# Patient Record
Sex: FEMALE | URBAN - METROPOLITAN AREA
[De-identification: names, ages, dates, MRNs, and addresses within clinical notes are randomized per-mention and may not be internally consistent; named-entity substitution may affect disease eponyms.]

---

## 2023-10-11 ENCOUNTER — APPOINTMENT (RX ONLY)
Dept: URBAN - METROPOLITAN AREA CLINIC 55 | Facility: CLINIC | Age: 33
Setting detail: DERMATOLOGY
End: 2023-10-11

## 2023-10-11 DIAGNOSIS — Z41.9 ENCOUNTER FOR PROCEDURE FOR PURPOSES OTHER THAN REMEDYING HEALTH STATE, UNSPECIFIED: ICD-10-CM

## 2023-10-11 PROCEDURE — ? INVENTORY

## 2023-10-11 PROCEDURE — ? COSMETIC CONSULTATION: GENERAL

## 2023-10-11 PROCEDURE — ? DYSPORT

## 2023-10-11 NOTE — PROCEDURE: DYSPORT
Show Depressor Anguli Units: Yes
Right Periorbital Skin Units: 0
Show Ucl Units: No
Additional Area 1 Location: lip
Dilution (U/0.1 Cc): 10
Consent obtained. Risks include but not limited to lid/brow ptosis, bruising, swelling, diplopia, temporary effect, incomplete chemical denervation.
Detail Level: Detailed
Additional Area 2 Location: DLI
Post-Care Instructions: Patient instructed to not lie down for 4 hours and limit physical activity for 24 hours.
Forehead Units: 20
Glabellar Complex Units: 25
Show Inventory Tab: Show
Additional Area 3 Location: Nasalis
Periorbital Skin Units: 30

## 2023-11-01 ENCOUNTER — APPOINTMENT (RX ONLY)
Dept: URBAN - METROPOLITAN AREA CLINIC 55 | Facility: CLINIC | Age: 33
Setting detail: DERMATOLOGY
End: 2023-11-01

## 2023-11-01 DIAGNOSIS — Z41.9 ENCOUNTER FOR PROCEDURE FOR PURPOSES OTHER THAN REMEDYING HEALTH STATE, UNSPECIFIED: ICD-10-CM

## 2023-11-01 PROCEDURE — ? INVENTORY

## 2023-11-01 PROCEDURE — ? DYSPORT

## 2023-11-01 NOTE — PROCEDURE: DYSPORT
Levator Labii Superioris Units: 0
Post-Care Instructions: Patient instructed to not lie down for 4 hours and limit physical activity for 24 hours.
Show Orbicularis Oculi Units: Yes
Detail Level: Detailed
Show Right And Left Periorbital Units: No
Additional Area 3 Location: Nasalis
Show Inventory Tab: Show
Additional Area 1 Location: lip
Forehead Units: 5
Additional Area 2 Location: DLI
Consent obtained. Risks include but not limited to lid/brow ptosis, bruising, swelling, diplopia, temporary effect, incomplete chemical denervation.
Dilution (U/0.1 Cc): 10

## 2024-01-10 ENCOUNTER — APPOINTMENT (RX ONLY)
Dept: URBAN - METROPOLITAN AREA CLINIC 55 | Facility: CLINIC | Age: 34
Setting detail: DERMATOLOGY
End: 2024-01-10

## 2024-01-10 DIAGNOSIS — Z41.9 ENCOUNTER FOR PROCEDURE FOR PURPOSES OTHER THAN REMEDYING HEALTH STATE, UNSPECIFIED: ICD-10-CM

## 2024-01-10 PROCEDURE — ? INVENTORY

## 2024-01-10 PROCEDURE — ? ICON IPL

## 2024-01-10 ASSESSMENT — LOCATION DETAILED DESCRIPTION DERM
LOCATION DETAILED: NASAL SUPRATIP
LOCATION DETAILED: LEFT CHIN

## 2024-01-10 ASSESSMENT — LOCATION ZONE DERM
LOCATION ZONE: FACE
LOCATION ZONE: NOSE

## 2024-01-10 ASSESSMENT — LOCATION SIMPLE DESCRIPTION DERM
LOCATION SIMPLE: NOSE
LOCATION SIMPLE: CHIN

## 2024-01-10 NOTE — PROCEDURE: ICON IPL
Pulse Duration (Optional- Include Units): 20 ms
Treatment Number (Optional): 0
Energy (Optional- Include Units): 38 J/cm2
Post-Care Instructions: I reviewed with the patient in detail post-care instructions.
Contact: Light
External Cooling Fan Speed: 5
Detail Level: Zone
Passes: 1
Pre-Procedure Text: After consent was obtained, the treatment areas were cleansed and treated using the parameters mentioned above.
Consent obtained, risks reviewed.
Energy (Optional- Include Units): 44 J/cm2

## 2024-01-26 ENCOUNTER — APPOINTMENT (RX ONLY)
Dept: URBAN - METROPOLITAN AREA CLINIC 55 | Facility: CLINIC | Age: 34
Setting detail: DERMATOLOGY
End: 2024-01-26

## 2024-01-26 DIAGNOSIS — Z41.9 ENCOUNTER FOR PROCEDURE FOR PURPOSES OTHER THAN REMEDYING HEALTH STATE, UNSPECIFIED: ICD-10-CM

## 2024-01-26 PROCEDURE — ? ICON IPL

## 2024-01-26 PROCEDURE — ? COSMETIC CONSULTATION: GENERAL

## 2024-01-26 PROCEDURE — ? INVENTORY

## 2024-01-26 ASSESSMENT — LOCATION SIMPLE DESCRIPTION DERM: LOCATION SIMPLE: NOSE

## 2024-01-26 ASSESSMENT — LOCATION DETAILED DESCRIPTION DERM: LOCATION DETAILED: NASAL SUPRATIP

## 2024-01-26 ASSESSMENT — LOCATION ZONE DERM: LOCATION ZONE: NOSE

## 2024-01-26 NOTE — PROCEDURE: ICON IPL
Contact: Light
Passes: 1
External Cooling Fan Speed: 5
Pre-Procedure Text: After consent was obtained, the treatment areas were cleansed and treated using the parameters mentioned above.
Anesthesia Volume In Cc: 0
Consent obtained, risks reviewed.
Energy (Optional- Include Units): 44 J/cm2
Post-Care Instructions: I reviewed with the patient in detail post-care instructions.
Pulse Duration (Optional- Include Units): 20 ms
Energy (Optional- Include Units): 38 J/cm2
Detail Level: Zone

## 2024-01-29 ENCOUNTER — APPOINTMENT (RX ONLY)
Dept: URBAN - METROPOLITAN AREA CLINIC 55 | Facility: CLINIC | Age: 34
Setting detail: DERMATOLOGY
End: 2024-01-29

## 2024-01-29 DIAGNOSIS — Z41.9 ENCOUNTER FOR PROCEDURE FOR PURPOSES OTHER THAN REMEDYING HEALTH STATE, UNSPECIFIED: ICD-10-CM

## 2024-01-29 PROCEDURE — ? IN-HOUSE DISPENSING PHARMACY

## 2024-01-29 PROCEDURE — ? INVENTORY

## 2024-01-29 NOTE — PROCEDURE: IN-HOUSE DISPENSING PHARMACY
Product 32 Price/Unit (In Dollars): 0
Render Refills If Set To 0: Yes
Product 73 Unit Type: mg
Product 5 Amount/Unit (Numbers Only): 30
Name Of Product 7: Lidocaine 23% / Tetracaine 7% Cream
Product 5 Application Directions: Apply nightly or as directed. Use SPF daily.
Product 6 Refills: 11
Name Of Product 5: Hydrating Tretinoin 0.025% Cream
Product 6 Unit Type: ml
Product 4 Application Directions: Apply nightly or as directed.
Name Of Product 1: Anti-Aging Brightening Cream
Detail Level: Zone
Name Of Product 4: Hydroquinone 8% Emulsion
Product 7 Units Dispensed: 1
Product 1 Refills: 2
Name Of Product 3: Acne Triple Combination Gel
Product 7 Application Directions: Apply only as directed
Name Of Product 2: Dapsone / Spironolactone Gel
Send Charges To Patient Encounter: No
Name Of Product 6: Rosacea Triple Combination Gel